# Patient Record
Sex: MALE | Race: WHITE | NOT HISPANIC OR LATINO | URBAN - METROPOLITAN AREA
[De-identification: names, ages, dates, MRNs, and addresses within clinical notes are randomized per-mention and may not be internally consistent; named-entity substitution may affect disease eponyms.]

---

## 2017-05-31 ENCOUNTER — OFFICE VISIT - HEALTHEAST (OUTPATIENT)
Dept: SURGERY | Facility: CLINIC | Age: 32
End: 2017-05-31

## 2017-05-31 DIAGNOSIS — K42.9 UMBILICAL HERNIA WITHOUT OBSTRUCTION AND WITHOUT GANGRENE: ICD-10-CM

## 2017-05-31 ASSESSMENT — MIFFLIN-ST. JEOR: SCORE: 1965.45

## 2017-06-05 ENCOUNTER — ANESTHESIA - HEALTHEAST (OUTPATIENT)
Dept: SURGERY | Facility: CLINIC | Age: 32
End: 2017-06-05

## 2017-06-06 ENCOUNTER — SURGERY - HEALTHEAST (OUTPATIENT)
Dept: SURGERY | Facility: CLINIC | Age: 32
End: 2017-06-06

## 2017-06-06 ASSESSMENT — MIFFLIN-ST. JEOR: SCORE: 1979.06

## 2017-06-19 ENCOUNTER — OFFICE VISIT - HEALTHEAST (OUTPATIENT)
Dept: SURGERY | Facility: CLINIC | Age: 32
End: 2017-06-19

## 2017-06-19 DIAGNOSIS — Z98.890 S/P HERNIA REPAIR: ICD-10-CM

## 2017-06-19 DIAGNOSIS — Z87.19 S/P HERNIA REPAIR: ICD-10-CM

## 2017-07-10 ENCOUNTER — OFFICE VISIT - HEALTHEAST (OUTPATIENT)
Dept: SURGERY | Facility: CLINIC | Age: 32
End: 2017-07-10

## 2017-07-10 DIAGNOSIS — T81.40XA POST OP INFECTION: ICD-10-CM

## 2017-07-10 DIAGNOSIS — Z98.890 POST-OPERATIVE STATE: ICD-10-CM

## 2021-05-31 VITALS — HEIGHT: 74 IN | WEIGHT: 214 LBS | BODY MASS INDEX: 27.46 KG/M2

## 2021-05-31 VITALS — HEIGHT: 74 IN | WEIGHT: 217 LBS | BODY MASS INDEX: 27.85 KG/M2

## 2021-06-11 NOTE — ANESTHESIA CARE TRANSFER NOTE
Last vitals:   Vitals:    06/06/17 0922   BP: 121/73   Pulse: 80   Resp: 12   Temp: 36.6  C (97.9  F)   SpO2: 98%     Patient's level of consciousness is awake  Spontaneous respirations: yes  Maintains airway independently: yes  Dentition unchanged: yes  Oropharynx: oropharynx clear of all foreign objects    QCDR Measures:  ASA# 20 - Surgical Safety Checklist: ASA20A - Safety Checks Done  PQRS# 430 - Adult PONV Prevention: 4558F - Pt received => 2 anti-emetic agents (different classes) preop & intraop  ASA# 8 - Peds PONV Prevention: NA - Not pediatric patient, not GA or 2 or more risk factors NOT present  PQRS# 424 - Lexie-op Temp Management: 4559F - At least one body temp DOCUMENTED => 35.5C or 95.9F within required timeframe  PQRS# 426 - PACU Transfer Protocol: - Transfer of care checklist used  ASA# 14 - Acute Post-op Pain: ASA14B - Patient did NOT experience pain >= 7 out of 10

## 2021-06-11 NOTE — PROGRESS NOTES
"General Surgery Consultation:    HPI:  This is a 31 y.o. male here today with concerns of a bulge at the umbilicus.  This swollen area is painful. He has noted this for the past 2 week(s).  He has had swelling at the umbilicus most of his life but the pain started with some events over the last 2 weeks.  Since his event 2 weeks ago he has ongoing discomfort at his umbilicus.     Allergies:Review of patient's allergies indicates no known allergies.    Past Medical History:   Diagnosis Date     Hernia 05/31/2017    umbilical hernia       Past Surgical History:   Procedure Laterality Date     WISDOM TOOTH EXTRACTION  2000       CURRENT MEDS:  No current outpatient prescriptions on file.     No current facility-administered medications for this visit.        Family History   Problem Relation Age of Onset     No Medical Problems Mother      Heart disease Father      No Medical Problems Sister         reports that he has been smoking.  He has quit using smokeless tobacco. He reports that he drinks about 8.4 oz of alcohol per week  He reports that he does not use illicit drugs.    Review of Systems -   10 point Review of systems is negative except for; as mentioned above in HPI and PMHx    /88 (Patient Site: Right Arm, Patient Position: Sitting, Cuff Size: Adult Large)  Pulse 91  Ht 6' 2\" (1.88 m)  Wt 214 lb (97.1 kg)  SpO2 98%  BMI 27.48 kg/m2  Body mass index is 27.48 kg/(m^2).    EXAM:  GENERAL: Well developed male  HEENT: EOMI, Anicteric Sclera  NECK:  No masses, good flexion and extention of the neck  CARDIAC: RRR w/out murmur   CHEST/LUNG: Clear  ABDOMEN: Umbilical hernia which is not reducible.  The umbilicus is tender to palpation  NEURO: He is ambulatory with good strength in both legs.    IMAGES: CT shows a fat containing Umbilical hernia    Assessment/Plan: Pt with an umbilical hernia. I discussed this at length with him.  I went over conservative management as well as surgical treatment for hernias.  " I reccomend an open umbilical henria repair..   I went over the small risks of surgery including but not limited to bleeding and infection. I discussed the expected recovery time as well. We will schedule this hernia repair at his earliest convenience.    Hugo Alexander MD  [facility]  [phone]

## 2021-06-11 NOTE — ANESTHESIA POSTPROCEDURE EVALUATION
Patient: Narendra Neves  UMBILICAL HERNIA REPAIR, OPEN WITH MESH  Anesthesia type: MAC    Patient location: PACU  Last vitals:   Vitals:    06/06/17 1030   BP: 128/84   Pulse: 62   Resp: 16   Temp:    SpO2: 99%     Post vital signs: stable  Level of consciousness: awake, alert and oriented  Post-anesthesia pain: pain controlled  Post-anesthesia nausea and vomiting: no  Pulmonary: unassisted, return to baseline  Cardiovascular: stable and blood pressure at baseline  Hydration: adequate  Anesthetic events: no    QCDR Measures:  ASA# 11 - Lexie-op Cardiac Arrest: ASA11B - Patient did NOT experience unanticipated cardiac arrest  ASA# 12 - Lexie-op Mortality Rate: ASA12B - Patient did NOT die  ASA# 13 - PACU Re-Intubation Rate: NA - No ETT / LMA used for case  ASA# 10 - Composite Anes Safety: ASA10A - No serious adverse event  ASA# 38 - New Corneal Injury: ASA38A - No new exposure keratitis or corneal abrasion in PACU    Additional Notes:

## 2021-06-11 NOTE — PROGRESS NOTES
"HPI: Pt is here for follow up umbilical hernia repair with Dr. Alexander on 6/06/17.   he is doing well.  Pain is well controlled.  No difficulties with the surgical wound/wounds. He noted a large amount of bruising a couple days after surgery, but that has begun to disappear. He felt a \"pop\" when attempting sit ups last week and caused the incision to bleed, but reports no difficulties since and he has not attempted to perform sit-ups since. 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  he is eating well and denies fever and chills.         BP (!) 144/99 (Patient Site: Left Arm, Patient Position: Sitting, Cuff Size: Adult Regular)  Pulse 92  SpO2 98%    EXAM:  GENERAL:Appears well  ABDOMEN:  Soft, +BS  SURGICAL WOUNDS:  Incisions healing well, large eschar on the right lateral aspect of umbilicus.      Assessment/Plan: Doing well after surgery and should follow up as needed. He needs to continue 20 pound weight restriction until 7/04/17. He knows to refrain from sit-ups and push-ups as well, and was given a note for his commanding officer.    Sharita Gillespie , [facility]       "

## 2021-06-11 NOTE — ANESTHESIA PREPROCEDURE EVALUATION
Anesthesia Evaluation      Patient summary reviewed   No history of anesthetic complications     Airway   Mallampati: I  Neck ROM: full   Pulmonary - normal exam    breath sounds clear to auscultation  (+) a smoker                         Cardiovascular - negative ROS and normal exam  Exercise tolerance: > or = 4 METS  (-) murmur  Rhythm: regular  Rate: normal,    no murmur      Neuro/Psych - negative ROS     Endo/Other - negative ROS      GI/Hepatic/Renal      Comments: Umbilical hernia            Dental - normal exam                        Anesthesia Plan  Planned anesthetic: MAC    ASA 2     Anesthetic plan and risks discussed with: patient    Post-op plan: routine recovery

## 2021-06-11 NOTE — PROGRESS NOTES
HPI: Pt is here for follow up of a umbilical hernia repair with post op infection.  He was seen in the ER on 6/30 and started on a 7 day course of keflex.  Since then he has noticed minimal improvement in the wound with some continued drainage requiring daily dressing changes, he also originally noticed an odor to the drainage but states that has improved since being on antibiotics.    He denies any pain to this site.  He denies fevers or chills and otherwise feels really well.     /83 (Patient Site: Right Arm, Patient Position: Sitting, Cuff Size: Adult Large)  Pulse 84  SpO2 98%    EXAM:  GENERAL:Appears well  ABDOMEN:  Soft, +BS  SURGICAL WOUNDS:  Umbilical incision with mild erythema noted in the umbilicus, suture material noted to be protruding out of primary area of drainage.  Area of granulation tissue noted at base of umbilcus,  Creamy drainage with dlight malodor   Non tender to palpation and no fluctuance noted.     Assessment/Plan: .    Suture granuloma vs resolving post op infection of umbilical incision.  Recommended daily cleansing and bacitracin application.  Extended keflex 3 more days to complete a 10 day course.     Reassured him that I suspect this will continue to heal well.   If he continue to have any s/s of infection after completion of antibiotics then we should follow up next week.     He agreed with this plan.    [facility] paperwork to be completed and will mail a copy to the patient.     Otherwise doing well after surgery and should follow up as needed.       Bakari Dubois, [facility] Department of Surgery